# Patient Record
Sex: MALE | Race: AMERICAN INDIAN OR ALASKA NATIVE | NOT HISPANIC OR LATINO
[De-identification: names, ages, dates, MRNs, and addresses within clinical notes are randomized per-mention and may not be internally consistent; named-entity substitution may affect disease eponyms.]

---

## 2019-06-17 ENCOUNTER — APPOINTMENT (OUTPATIENT)
Dept: UROLOGY | Facility: CLINIC | Age: 49
End: 2019-06-17
Payer: COMMERCIAL

## 2019-06-17 DIAGNOSIS — Z82.49 FAMILY HISTORY OF ISCHEMIC HEART DISEASE AND OTHER DISEASES OF THE CIRCULATORY SYSTEM: ICD-10-CM

## 2019-06-17 DIAGNOSIS — I10 ESSENTIAL (PRIMARY) HYPERTENSION: ICD-10-CM

## 2019-06-17 DIAGNOSIS — Z78.9 OTHER SPECIFIED HEALTH STATUS: ICD-10-CM

## 2019-06-17 DIAGNOSIS — Z87.891 PERSONAL HISTORY OF NICOTINE DEPENDENCE: ICD-10-CM

## 2019-06-17 PROCEDURE — 99204 OFFICE O/P NEW MOD 45 MIN: CPT

## 2019-06-17 RX ORDER — AMLODIPINE BESYLATE 5 MG/1
5 TABLET ORAL
Refills: 0 | Status: ACTIVE | COMMUNITY

## 2019-06-17 NOTE — HISTORY OF PRESENT ILLNESS
[FreeTextEntry1] : This is a 49 year male who presents for evaluation of blood in the urine. no gross hematuria\par \par Medical records from Dr. Blair office reviewed:\par Cr 1.09\par April UA - 0-2 RBC\par March 2019- 20RBC\par \par April 2019\par CT scan with contrast\par IMPRESSION:  \par 1. Benign left renal cysts largest the upper pole measuring approximately 2 cm.\par 2. No renal calculi or obstructive uropathy.\par 3. Probable small gallstone.\par 4. Nonspecific 3 to 4 mm nodule left lung base pleural-based.\par  \par

## 2019-06-17 NOTE — PHYSICAL EXAM
[General Appearance - Well Developed] : well developed [General Appearance - Well Nourished] : well nourished [Normal Appearance] : normal appearance [General Appearance - In No Acute Distress] : no acute distress [Edema] : no peripheral edema [Well Groomed] : well groomed [Respiration, Rhythm And Depth] : normal respiratory rhythm and effort [Abdomen Soft] : soft [Abdomen Tenderness] : non-tender [Exaggerated Use Of Accessory Muscles For Inspiration] : no accessory muscle use [Costovertebral Angle Tenderness] : no ~M costovertebral angle tenderness [No Focal Deficits] : no focal deficits [] : no rash [Normal Station and Gait] : the gait and station were normal for the patient's age [Oriented To Time, Place, And Person] : oriented to person, place, and time [Affect] : the affect was normal [Mood] : the mood was normal [Not Anxious] : not anxious [No Palpable Adenopathy] : no palpable adenopathy

## 2019-06-17 NOTE — ASSESSMENT
[FreeTextEntry1] : This is a 49 year male who presents for evaluation of blood in the urine. no gross hematuria\par \par Medical records from Dr. Blair office reviewed:\par Cr 1.09\par April UA - 0-2 RBC\par March 2019- 20RBC\par \par April 2019\par IMPRESSION:  \par 1. Benign left renal cysts largest the upper pole measuring approximately 2 cm.\par 2. No renal calculi or obstructive uropathy.\par 3. Probable small gallstone.\par 4. Nonspecific 3 to 4 mm nodule left lung base pleural-based.\par  \par

## 2019-07-09 LAB — URINE CYTOLOGY: NORMAL

## 2019-07-26 ENCOUNTER — APPOINTMENT (OUTPATIENT)
Dept: UROLOGY | Facility: CLINIC | Age: 49
End: 2019-07-26

## 2019-09-16 ENCOUNTER — APPOINTMENT (OUTPATIENT)
Dept: UROLOGY | Facility: CLINIC | Age: 49
End: 2019-09-16
Payer: COMMERCIAL

## 2019-09-16 PROCEDURE — 52000 CYSTOURETHROSCOPY: CPT

## 2019-09-16 PROCEDURE — 99212 OFFICE O/P EST SF 10 MIN: CPT | Mod: 25

## 2019-09-17 NOTE — ASSESSMENT
[FreeTextEntry1] : This is a 49 year male who presents for evaluation of blood in the urine. no gross hematuria\par \par Medical records from Dr. Blair office reviewed:\par Cr 1.09\par April UA - 0-2 RBC\par March 2019- 20RBC\par \par April 2019\par CT scan with contrast\par IMPRESSION: \par 1. Benign left renal cysts largest the upper pole measuring approximately 2 cm.\par 2. No renal calculi or obstructive uropathy.\par 3. Probable small gallstone.\par 4. Nonspecific 3 to 4 mm nodule left lung base pleural-based.\par \par cysto today showed a small yellow lobular mass from the top of the bladder neck. no papillary

## 2019-10-15 ENCOUNTER — OUTPATIENT (OUTPATIENT)
Dept: OUTPATIENT SERVICES | Facility: HOSPITAL | Age: 49
LOS: 1 days | Discharge: HOME | End: 2019-10-15
Payer: COMMERCIAL

## 2019-10-15 VITALS
OXYGEN SATURATION: 95 % | RESPIRATION RATE: 16 BRPM | HEIGHT: 67 IN | SYSTOLIC BLOOD PRESSURE: 143 MMHG | WEIGHT: 172.18 LBS | DIASTOLIC BLOOD PRESSURE: 95 MMHG | TEMPERATURE: 98 F | HEART RATE: 92 BPM

## 2019-10-15 DIAGNOSIS — R31.9 HEMATURIA, UNSPECIFIED: ICD-10-CM

## 2019-10-15 DIAGNOSIS — Z01.818 ENCOUNTER FOR OTHER PREPROCEDURAL EXAMINATION: ICD-10-CM

## 2019-10-15 LAB
ALBUMIN SERPL ELPH-MCNC: 4.8 G/DL — SIGNIFICANT CHANGE UP (ref 3.5–5.2)
ALP SERPL-CCNC: 52 U/L — SIGNIFICANT CHANGE UP (ref 30–115)
ALT FLD-CCNC: 23 U/L — SIGNIFICANT CHANGE UP (ref 0–41)
ANION GAP SERPL CALC-SCNC: 14 MMOL/L — SIGNIFICANT CHANGE UP (ref 7–14)
APPEARANCE UR: CLEAR — SIGNIFICANT CHANGE UP
APTT BLD: 33.8 SEC — SIGNIFICANT CHANGE UP (ref 27–39.2)
AST SERPL-CCNC: 22 U/L — SIGNIFICANT CHANGE UP (ref 0–41)
BACTERIA # UR AUTO: NEGATIVE — SIGNIFICANT CHANGE UP
BILIRUB SERPL-MCNC: 0.2 MG/DL — SIGNIFICANT CHANGE UP (ref 0.2–1.2)
BILIRUB UR-MCNC: NEGATIVE — SIGNIFICANT CHANGE UP
BUN SERPL-MCNC: 20 MG/DL — SIGNIFICANT CHANGE UP (ref 10–20)
CALCIUM SERPL-MCNC: 9.4 MG/DL — SIGNIFICANT CHANGE UP (ref 8.5–10.1)
CHLORIDE SERPL-SCNC: 99 MMOL/L — SIGNIFICANT CHANGE UP (ref 98–110)
CO2 SERPL-SCNC: 27 MMOL/L — SIGNIFICANT CHANGE UP (ref 17–32)
COLOR SPEC: SIGNIFICANT CHANGE UP
CREAT SERPL-MCNC: 1.5 MG/DL — SIGNIFICANT CHANGE UP (ref 0.7–1.5)
DIFF PNL FLD: ABNORMAL
EPI CELLS # UR: 0 /HPF — SIGNIFICANT CHANGE UP (ref 0–5)
GLUCOSE SERPL-MCNC: 88 MG/DL — SIGNIFICANT CHANGE UP (ref 70–99)
GLUCOSE UR QL: NEGATIVE — SIGNIFICANT CHANGE UP
HCT VFR BLD CALC: 45.9 % — SIGNIFICANT CHANGE UP (ref 42–52)
HGB BLD-MCNC: 14.9 G/DL — SIGNIFICANT CHANGE UP (ref 14–18)
HYALINE CASTS # UR AUTO: 1 /LPF — SIGNIFICANT CHANGE UP (ref 0–7)
INR BLD: 0.89 RATIO — SIGNIFICANT CHANGE UP (ref 0.65–1.3)
KETONES UR-MCNC: NEGATIVE — SIGNIFICANT CHANGE UP
LEUKOCYTE ESTERASE UR-ACNC: NEGATIVE — SIGNIFICANT CHANGE UP
MCHC RBC-ENTMCNC: 27.9 PG — SIGNIFICANT CHANGE UP (ref 27–31)
MCHC RBC-ENTMCNC: 32.5 G/DL — SIGNIFICANT CHANGE UP (ref 32–37)
MCV RBC AUTO: 86 FL — SIGNIFICANT CHANGE UP (ref 80–94)
NITRITE UR-MCNC: NEGATIVE — SIGNIFICANT CHANGE UP
NRBC # BLD: 0 /100 WBCS — SIGNIFICANT CHANGE UP (ref 0–0)
PH UR: 6 — SIGNIFICANT CHANGE UP (ref 5–8)
PLATELET # BLD AUTO: 308 K/UL — SIGNIFICANT CHANGE UP (ref 130–400)
POTASSIUM SERPL-MCNC: 3.6 MMOL/L — SIGNIFICANT CHANGE UP (ref 3.5–5)
POTASSIUM SERPL-SCNC: 3.6 MMOL/L — SIGNIFICANT CHANGE UP (ref 3.5–5)
PROT SERPL-MCNC: 8.1 G/DL — HIGH (ref 6–8)
PROT UR-MCNC: ABNORMAL
PROTHROM AB SERPL-ACNC: 10.2 SEC — SIGNIFICANT CHANGE UP (ref 9.95–12.87)
RBC # BLD: 5.34 M/UL — SIGNIFICANT CHANGE UP (ref 4.7–6.1)
RBC # FLD: 12.3 % — SIGNIFICANT CHANGE UP (ref 11.5–14.5)
RBC CASTS # UR COMP ASSIST: 23 /HPF — HIGH (ref 0–4)
SODIUM SERPL-SCNC: 140 MMOL/L — SIGNIFICANT CHANGE UP (ref 135–146)
SP GR SPEC: 1.02 — SIGNIFICANT CHANGE UP (ref 1.01–1.02)
UROBILINOGEN FLD QL: SIGNIFICANT CHANGE UP
WBC # BLD: 10.02 K/UL — SIGNIFICANT CHANGE UP (ref 4.8–10.8)
WBC # FLD AUTO: 10.02 K/UL — SIGNIFICANT CHANGE UP (ref 4.8–10.8)
WBC UR QL: 2 /HPF — SIGNIFICANT CHANGE UP (ref 0–5)

## 2019-10-15 PROCEDURE — 93010 ELECTROCARDIOGRAM REPORT: CPT

## 2019-10-15 NOTE — H&P PST ADULT - HISTORY OF PRESENT ILLNESS
49 year old male here had red blood cells in urine here for bladder bx  fos=4  denies chest pain sob palp  denies recent uri or uti

## 2019-10-15 NOTE — H&P PST ADULT - NSANTHOSAYNRD_GEN_A_CORE
denies/No. ANICETO screening performed.  STOP BANG Legend: 0-2 = LOW Risk; 3-4 = INTERMEDIATE Risk; 5-8 = HIGH Risk

## 2019-10-17 LAB
CULTURE RESULTS: SIGNIFICANT CHANGE UP
SPECIMEN SOURCE: SIGNIFICANT CHANGE UP

## 2019-10-29 ENCOUNTER — RESULT REVIEW (OUTPATIENT)
Age: 49
End: 2019-10-29

## 2019-10-29 ENCOUNTER — OUTPATIENT (OUTPATIENT)
Dept: OUTPATIENT SERVICES | Facility: HOSPITAL | Age: 49
LOS: 1 days | Discharge: HOME | End: 2019-10-29
Payer: COMMERCIAL

## 2019-10-29 ENCOUNTER — APPOINTMENT (OUTPATIENT)
Dept: UROLOGY | Facility: HOSPITAL | Age: 49
End: 2019-10-29
Payer: COMMERCIAL

## 2019-10-29 VITALS
TEMPERATURE: 99 F | WEIGHT: 169.98 LBS | HEART RATE: 88 BPM | OXYGEN SATURATION: 99 % | HEIGHT: 67 IN | SYSTOLIC BLOOD PRESSURE: 150 MMHG | RESPIRATION RATE: 18 BRPM | DIASTOLIC BLOOD PRESSURE: 74 MMHG

## 2019-10-29 VITALS — SYSTOLIC BLOOD PRESSURE: 124 MMHG | HEART RATE: 85 BPM | DIASTOLIC BLOOD PRESSURE: 73 MMHG | RESPIRATION RATE: 17 BRPM

## 2019-10-29 PROBLEM — I10 ESSENTIAL (PRIMARY) HYPERTENSION: Chronic | Status: ACTIVE | Noted: 2019-10-15

## 2019-10-29 PROCEDURE — 88305 TISSUE EXAM BY PATHOLOGIST: CPT | Mod: 26

## 2019-10-29 PROCEDURE — 88112 CYTOPATH CELL ENHANCE TECH: CPT | Mod: 26

## 2019-10-29 PROCEDURE — 52204 CYSTOSCOPY W/BIOPSY(S): CPT

## 2019-10-29 RX ORDER — OXYCODONE AND ACETAMINOPHEN 5; 325 MG/1; MG/1
1 TABLET ORAL EVERY 4 HOURS
Refills: 0 | Status: DISCONTINUED | OUTPATIENT
Start: 2019-10-29 | End: 2019-10-29

## 2019-10-29 RX ORDER — SODIUM CHLORIDE 9 MG/ML
1000 INJECTION, SOLUTION INTRAVENOUS
Refills: 0 | Status: DISCONTINUED | OUTPATIENT
Start: 2019-10-29 | End: 2019-11-17

## 2019-10-29 RX ORDER — ONDANSETRON 8 MG/1
4 TABLET, FILM COATED ORAL ONCE
Refills: 0 | Status: DISCONTINUED | OUTPATIENT
Start: 2019-10-29 | End: 2019-11-17

## 2019-10-29 RX ORDER — PHENAZOPYRIDINE HCL 100 MG
1 TABLET ORAL
Qty: 4 | Refills: 0
Start: 2019-10-29 | End: 2019-10-30

## 2019-10-29 RX ORDER — HYDROMORPHONE HYDROCHLORIDE 2 MG/ML
0.5 INJECTION INTRAMUSCULAR; INTRAVENOUS; SUBCUTANEOUS
Refills: 0 | Status: DISCONTINUED | OUTPATIENT
Start: 2019-10-29 | End: 2019-10-29

## 2019-10-29 RX ORDER — ACETAMINOPHEN 500 MG
650 TABLET ORAL ONCE
Refills: 0 | Status: DISCONTINUED | OUTPATIENT
Start: 2019-10-29 | End: 2019-11-17

## 2019-10-29 RX ADMIN — SODIUM CHLORIDE 100 MILLILITER(S): 9 INJECTION, SOLUTION INTRAVENOUS at 08:45

## 2019-10-29 NOTE — ASU DISCHARGE PLAN (ADULT/PEDIATRIC) - CARE PROVIDER_API CALL
Miguel Saravia)  [facility]  [address] [place] [address], [address]  [place] [address]  Phone: [phone]  Fax: [phone]  Follow Up Time:

## 2019-10-30 LAB
NON-GYNECOLOGICAL CYTOLOGY STUDY: SIGNIFICANT CHANGE UP
SURGICAL PATHOLOGY STUDY: SIGNIFICANT CHANGE UP

## 2019-11-04 ENCOUNTER — APPOINTMENT (OUTPATIENT)
Dept: UROLOGY | Facility: CLINIC | Age: 49
End: 2019-11-04
Payer: COMMERCIAL

## 2019-11-04 DIAGNOSIS — I10 ESSENTIAL (PRIMARY) HYPERTENSION: ICD-10-CM

## 2019-11-04 DIAGNOSIS — Z71.2 PERSON CONSULTING FOR EXPLANATION OF EXAMINATION OR TEST FINDINGS: ICD-10-CM

## 2019-11-04 DIAGNOSIS — N32.89 OTHER SPECIFIED DISORDERS OF BLADDER: ICD-10-CM

## 2019-11-04 DIAGNOSIS — R31.9 HEMATURIA, UNSPECIFIED: ICD-10-CM

## 2019-11-04 DIAGNOSIS — N28.1 CYST OF KIDNEY, ACQUIRED: ICD-10-CM

## 2019-11-04 PROCEDURE — 99214 OFFICE O/P EST MOD 30 MIN: CPT

## 2019-11-04 NOTE — HISTORY OF PRESENT ILLNESS
[FreeTextEntry1] : This is a 49 year male who presents for evaluation of blood in the urine. no gross hematuria\par \par Medical records from Dr. Blair office reviewed:\par Cr 1.09\par April UA - 0-2 RBC\par March 2019- 20RBC\par \par April 2019\par CT scan with contrast\par IMPRESSION: \par 1. Benign left renal cysts largest the upper pole measuring approximately 2 cm.\par 2. No renal calculi or obstructive uropathy.\par 3. Probable small gallstone.\par 4. Nonspecific 3 to 4 mm nodule left lung base pleural-based.\par \par cysto showed a small yellow lobular mass from the top of the bladder neck. no papillary. \par this was biopsied and showed no malignancy\par \par will order renal sonogram for renal cyst evaluation at the next visit

## 2020-11-04 ENCOUNTER — APPOINTMENT (OUTPATIENT)
Dept: UROLOGY | Facility: CLINIC | Age: 50
End: 2020-11-04

## 2020-12-23 ENCOUNTER — EMERGENCY (EMERGENCY)
Facility: HOSPITAL | Age: 50
LOS: 0 days | Discharge: HOME | End: 2020-12-23
Attending: EMERGENCY MEDICINE | Admitting: EMERGENCY MEDICINE
Payer: COMMERCIAL

## 2020-12-23 ENCOUNTER — TRANSCRIPTION ENCOUNTER (OUTPATIENT)
Age: 50
End: 2020-12-23

## 2020-12-23 VITALS — HEART RATE: 87 BPM | DIASTOLIC BLOOD PRESSURE: 81 MMHG | SYSTOLIC BLOOD PRESSURE: 126 MMHG

## 2020-12-23 VITALS — WEIGHT: 145.95 LBS | HEIGHT: 67 IN

## 2020-12-23 DIAGNOSIS — R10.9 UNSPECIFIED ABDOMINAL PAIN: ICD-10-CM

## 2020-12-23 DIAGNOSIS — K57.92 DIVERTICULITIS OF INTESTINE, PART UNSPECIFIED, WITHOUT PERFORATION OR ABSCESS WITHOUT BLEEDING: ICD-10-CM

## 2020-12-23 DIAGNOSIS — K63.89 OTHER SPECIFIED DISEASES OF INTESTINE: ICD-10-CM

## 2020-12-23 LAB
ALBUMIN SERPL ELPH-MCNC: 4.2 G/DL — SIGNIFICANT CHANGE UP (ref 3.5–5.2)
ALP SERPL-CCNC: 72 U/L — SIGNIFICANT CHANGE UP (ref 30–115)
ALT FLD-CCNC: 43 U/L — HIGH (ref 0–41)
ANION GAP SERPL CALC-SCNC: 11 MMOL/L — SIGNIFICANT CHANGE UP (ref 7–14)
APPEARANCE UR: CLEAR — SIGNIFICANT CHANGE UP
AST SERPL-CCNC: 47 U/L — HIGH (ref 0–41)
BASOPHILS # BLD AUTO: 0.06 K/UL — SIGNIFICANT CHANGE UP (ref 0–0.2)
BASOPHILS NFR BLD AUTO: 0.4 % — SIGNIFICANT CHANGE UP (ref 0–1)
BILIRUB DIRECT SERPL-MCNC: <0.2 MG/DL — SIGNIFICANT CHANGE UP (ref 0–0.2)
BILIRUB INDIRECT FLD-MCNC: >0.1 MG/DL — LOW (ref 0.2–1.2)
BILIRUB SERPL-MCNC: 0.3 MG/DL — SIGNIFICANT CHANGE UP (ref 0.2–1.2)
BILIRUB UR-MCNC: NEGATIVE — SIGNIFICANT CHANGE UP
BUN SERPL-MCNC: 20 MG/DL — SIGNIFICANT CHANGE UP (ref 10–20)
CALCIUM SERPL-MCNC: 9.6 MG/DL — SIGNIFICANT CHANGE UP (ref 8.5–10.1)
CHLORIDE SERPL-SCNC: 99 MMOL/L — SIGNIFICANT CHANGE UP (ref 98–110)
CO2 SERPL-SCNC: 30 MMOL/L — SIGNIFICANT CHANGE UP (ref 17–32)
COLOR SPEC: YELLOW — SIGNIFICANT CHANGE UP
CREAT SERPL-MCNC: 1.3 MG/DL — SIGNIFICANT CHANGE UP (ref 0.7–1.5)
DIFF PNL FLD: ABNORMAL
EOSINOPHIL # BLD AUTO: 0.24 K/UL — SIGNIFICANT CHANGE UP (ref 0–0.7)
EOSINOPHIL NFR BLD AUTO: 1.7 % — SIGNIFICANT CHANGE UP (ref 0–8)
GLUCOSE SERPL-MCNC: 146 MG/DL — HIGH (ref 70–99)
GLUCOSE UR QL: NEGATIVE MG/DL — SIGNIFICANT CHANGE UP
HCT VFR BLD CALC: 45.3 % — SIGNIFICANT CHANGE UP (ref 42–52)
HGB BLD-MCNC: 14.7 G/DL — SIGNIFICANT CHANGE UP (ref 14–18)
IMM GRANULOCYTES NFR BLD AUTO: 0.3 % — SIGNIFICANT CHANGE UP (ref 0.1–0.3)
KETONES UR-MCNC: NEGATIVE — SIGNIFICANT CHANGE UP
LACTATE SERPL-SCNC: 1.3 MMOL/L — SIGNIFICANT CHANGE UP (ref 0.7–2)
LEUKOCYTE ESTERASE UR-ACNC: NEGATIVE — SIGNIFICANT CHANGE UP
LIDOCAIN IGE QN: 49 U/L — SIGNIFICANT CHANGE UP (ref 7–60)
LYMPHOCYTES # BLD AUTO: 1.73 K/UL — SIGNIFICANT CHANGE UP (ref 1.2–3.4)
LYMPHOCYTES # BLD AUTO: 12.2 % — LOW (ref 20.5–51.1)
MCHC RBC-ENTMCNC: 27.9 PG — SIGNIFICANT CHANGE UP (ref 27–31)
MCHC RBC-ENTMCNC: 32.5 G/DL — SIGNIFICANT CHANGE UP (ref 32–37)
MCV RBC AUTO: 86 FL — SIGNIFICANT CHANGE UP (ref 80–94)
MONOCYTES # BLD AUTO: 0.82 K/UL — HIGH (ref 0.1–0.6)
MONOCYTES NFR BLD AUTO: 5.8 % — SIGNIFICANT CHANGE UP (ref 1.7–9.3)
NEUTROPHILS # BLD AUTO: 11.33 K/UL — HIGH (ref 1.4–6.5)
NEUTROPHILS NFR BLD AUTO: 79.6 % — HIGH (ref 42.2–75.2)
NITRITE UR-MCNC: NEGATIVE — SIGNIFICANT CHANGE UP
NRBC # BLD: 0 /100 WBCS — SIGNIFICANT CHANGE UP (ref 0–0)
PH UR: 6 — SIGNIFICANT CHANGE UP (ref 5–8)
PLATELET # BLD AUTO: 279 K/UL — SIGNIFICANT CHANGE UP (ref 130–400)
POTASSIUM SERPL-MCNC: 4.1 MMOL/L — SIGNIFICANT CHANGE UP (ref 3.5–5)
POTASSIUM SERPL-SCNC: 4.1 MMOL/L — SIGNIFICANT CHANGE UP (ref 3.5–5)
PROT SERPL-MCNC: 7.7 G/DL — SIGNIFICANT CHANGE UP (ref 6–8)
PROT UR-MCNC: 30 MG/DL
RBC # BLD: 5.27 M/UL — SIGNIFICANT CHANGE UP (ref 4.7–6.1)
RBC # FLD: 12.6 % — SIGNIFICANT CHANGE UP (ref 11.5–14.5)
RBC CASTS # UR COMP ASSIST: >50 /HPF
SODIUM SERPL-SCNC: 140 MMOL/L — SIGNIFICANT CHANGE UP (ref 135–146)
SP GR SPEC: 1.01 — SIGNIFICANT CHANGE UP (ref 1.01–1.03)
UROBILINOGEN FLD QL: 0.2 MG/DL — SIGNIFICANT CHANGE UP (ref 0.2–0.2)
WBC # BLD: 14.22 K/UL — HIGH (ref 4.8–10.8)
WBC # FLD AUTO: 14.22 K/UL — HIGH (ref 4.8–10.8)
WBC UR QL: ABNORMAL /HPF

## 2020-12-23 PROCEDURE — 99284 EMERGENCY DEPT VISIT MOD MDM: CPT

## 2020-12-23 PROCEDURE — 74177 CT ABD & PELVIS W/CONTRAST: CPT | Mod: 26

## 2020-12-23 RX ORDER — MOXIFLOXACIN HYDROCHLORIDE TABLETS, 400 MG 400 MG/1
1 TABLET, FILM COATED ORAL
Qty: 14 | Refills: 0
Start: 2020-12-23 | End: 2020-12-29

## 2020-12-23 RX ORDER — METRONIDAZOLE 500 MG
1 TABLET ORAL
Qty: 21 | Refills: 0
Start: 2020-12-23 | End: 2020-12-29

## 2020-12-23 RX ORDER — AMLODIPINE BESYLATE 2.5 MG/1
1 TABLET ORAL
Qty: 0 | Refills: 0 | DISCHARGE

## 2020-12-23 RX ORDER — SODIUM CHLORIDE 9 MG/ML
1000 INJECTION, SOLUTION INTRAVENOUS ONCE
Refills: 0 | Status: COMPLETED | OUTPATIENT
Start: 2020-12-23 | End: 2020-12-23

## 2020-12-23 RX ORDER — MORPHINE SULFATE 50 MG/1
4 CAPSULE, EXTENDED RELEASE ORAL ONCE
Refills: 0 | Status: DISCONTINUED | OUTPATIENT
Start: 2020-12-23 | End: 2020-12-23

## 2020-12-23 RX ADMIN — SODIUM CHLORIDE 1000 MILLILITER(S): 9 INJECTION, SOLUTION INTRAVENOUS at 16:57

## 2020-12-23 RX ADMIN — MORPHINE SULFATE 4 MILLIGRAM(S): 50 CAPSULE, EXTENDED RELEASE ORAL at 16:57

## 2020-12-23 RX ADMIN — MORPHINE SULFATE 4 MILLIGRAM(S): 50 CAPSULE, EXTENDED RELEASE ORAL at 17:12

## 2020-12-23 NOTE — ED ADULT TRIAGE NOTE - NS ED TRIAGE AVPU SCALE
comfortable appearance/cooperative
Alert-The patient is alert, awake and responds to voice. The patient is oriented to time, place, and person. The triage nurse is able to obtain subjective information.

## 2020-12-23 NOTE — ED PROVIDER NOTE - PHYSICAL EXAMINATION
Physical Exam    Vital Signs: I have reviewed the initial vital signs.  Constitutional: well-nourished, appears stated age, no acute distress  Eyes: Conjunctiva pink, Sclera clear.  Cardiovascular: S1 and S2, regular rate, regular rhythm, well-perfused extremities, radial pulses equal and 2+  Respiratory: unlabored respiratory effort, clear to auscultation bilaterally no wheezing, rales and rhonchi  Gastrointestinal: soft, ttp rlq, no pulsatile mass, normal bowl sounds  Musculoskeletal: supple neck, no lower extremity edema, no midline tenderness  Integumentary: warm, dry, no rash  Neurologic: awake, alert, nvi

## 2020-12-23 NOTE — ED PROVIDER NOTE - NSFOLLOWUPINSTRUCTIONS_ED_ALL_ED_FT
Diverticulitis    Diverticulitis is inflammation or infection of small pouches in your colon that form when you HAVE a condition called diverticulosis. This condition can range from mild to severe potentially leading to perforation or obstructions of your colon. Symptoms include abdominal pain, fever/chills, nausea, vomiting, diarrhea, constipation, or blood in your stool. If you were prescribed an antibiotic medicine, take it as told by your health care provider. Do not stop taking the antibiotic even if you start to feel better.    SEEK IMMEDIATE MEDICAL CARE IF YOU HAVE ANY OF THE FOLLOWING SYMPTOMS: worsening abdominal pain, high fever, inability to hold down liquids or medication, black or bloody stools, inability to pass gas, lightheadedness/dizziness, or a change in mental status.    Epiploic Appendagitis    What is Epiploic appendagitis?    Epiploic appendagitis can be defined as the inflammatory or ischemic process that occurs at the level of the epiploic appendices. There are certain characteristics that can be used in order to describe this inflammatory process – first of all, the condition is benign and self-limiting. It is non-surgical, which means that it cannot be solved through a surgical intervention. It is also an uncommon condition, which means that there are not many people diagnosed with it (rare cause of acute abdomen)    Causes    In the majority of the cases, the inflammatory process that occurs at the level of the epiploic appendices is due to a torsion of an appendage that is both large and pedunculated. In other patients, the venous thrombosis is responsible for the acute inflammation of the epiploic appendices (the thrombosis occurs at the level of the epiploic appendage central draining vein). When the spontaneous thrombosis of the venous overflow occurs, the patient will suffer from both ischemia and necrosis.    Symptoms    These are the most common symptoms caused by epiploic appendagitis:   Pain Caused by the inflammatory process  Characterized as sharp or stabbing  Can be present in different regions of the abdomen (left, right, central), depending on the location of inflammation at the level of the colon/rectum  Pain becomes aggravated during or after defecation or urination (the straining or emptying of the bowels or the bladder create a traction effect on the inflamed epiploic appendices, hence the aggravation of the pain)    Nausea and vomiting  Guarding can also be present as a symptom  Focal peritoneal irritation    It is important to understand that the symptoms of epiploic appendagitis can very well resemble the ones of acute appendicitis, not to mention diverticulitis or cholecystitis. In general, if the epiploic appendagitis occurs on the right-side of the abdomen, it can mimic the symptoms of appendicitis and right-sided diverticulitis. However, if the condition occurs on the left-side of the abdomen, it will mimic the symptoms of sigmoid diverticulitis.      Treatment    In general, this is a self-limiting disease and the treatment is often concentrated on the management of the symptoms. For the majority of the cases, the condition resolves itself on 5-7 days, without any surgical intervention being necessary. These are the recommended methods of treatment for epiploic appendagitis:  Anti-inflammatory (NSAIDs) or pain medication Recommended as symptomatic treatment (pain, discomfort)    Laparoscopic surgery Only recommended in patients who suffer from constant recurrences  The inflamed appendage is excised through the laparoscopic surgical intervention, thus eliminating the risk of this condition recurring.

## 2020-12-23 NOTE — ED PROVIDER NOTE - OBJECTIVE STATEMENT
51 yo male, pmh of htn, presents to ed for abd pain. started last night, rlq, mild, aching, no radiation, [place] [facility] [place]. Denies fever, chills, testicular pain, dysuria, hematuria, back pain, neck pain, nvd.

## 2020-12-23 NOTE — ED PROVIDER NOTE - PATIENT PORTAL LINK FT
You can access the FollowMyHealth Patient Portal offered by [facility] by registering at the following website: [email or web address]. By joining [employer]’s FollowMyHealth portal, you will also be able to view your health information using other applications (apps) compatible with our system.

## 2020-12-23 NOTE — ED PROVIDER NOTE - CLINICAL SUMMARY MEDICAL DECISION MAKING FREE TEXT BOX
49 yo M, hx of well controlled HTN, here for assessment of RLQ abdominal pain -- pain began last night, aching worse with movement, no nausea, vomiting, fever. Sent from [facility] for r/o appendicitis.    VS normal, Patient has RLQ ttp, no rebound or guarding, normal testicular exam without swelling or ttp, no rash, no LE edema.    Labs unremarkable, UA is positive for blood and WBC, unclear why as he has no kidney stone on CT, could be hemorrhagic cystitis, culture pending. CT scan demonstrates normal appendix but also diverticulitis vs epiploic appendagitis    Patient started on cipro/flagyl -- will cover both diverticulitis and possible UTI. Advised patient on need for GI follow up as his sx are R sided, he is young and could potentially represent underlying mass.

## 2020-12-23 NOTE — ED PROVIDER NOTE - CARE PROVIDER_API CALL
Bertha Carreon (MD)  Gastroenterology  [address] [place] [address]  Phone: [phone]  Fax: [phone]  Follow Up Time:

## 2021-01-19 ENCOUNTER — APPOINTMENT (OUTPATIENT)
Dept: GASTROENTEROLOGY | Facility: CLINIC | Age: 51
End: 2021-01-19

## 2021-02-12 ENCOUNTER — OUTPATIENT (OUTPATIENT)
Dept: OUTPATIENT SERVICES | Facility: HOSPITAL | Age: 51
LOS: 1 days | Discharge: HOME | End: 2021-02-12

## 2021-02-12 ENCOUNTER — APPOINTMENT (OUTPATIENT)
Dept: GASTROENTEROLOGY | Facility: CLINIC | Age: 51
End: 2021-02-12
Payer: COMMERCIAL

## 2021-02-12 DIAGNOSIS — Z00.00 ENCOUNTER FOR GENERAL ADULT MEDICAL EXAMINATION WITHOUT ABNORMAL FINDINGS: ICD-10-CM

## 2021-02-12 DIAGNOSIS — K57.32 DIVERTICULITIS OF LARGE INTESTINE W/OUT PERFORATION OR ABSCESS W/OUT BLEEDING: ICD-10-CM

## 2021-02-12 DIAGNOSIS — Z00.00 ENCOUNTER FOR GENERAL ADULT MEDICAL EXAMINATION W/OUT ABNORMAL FINDINGS: ICD-10-CM

## 2021-02-12 PROCEDURE — 99213 OFFICE O/P EST LOW 20 MIN: CPT | Mod: 95,GE

## 2021-02-12 RX ORDER — POLYETHYLENE GLYCOL 3350 17 G/17G
17 POWDER, FOR SOLUTION ORAL
Qty: 1 | Refills: 0 | Status: ACTIVE | COMMUNITY
Start: 2021-02-12 | End: 1900-01-01

## 2021-02-12 NOTE — REASON FOR VISIT
[Home] : at home, [unfilled] , at the time of the visit. [Medical Office: ([facility])___] : at the medical office located in  [Verbal consent obtained from patient] : the patient, [unfilled] [Spouse] : spouse [Initial Evaluation] : an initial evaluation

## 2021-02-12 NOTE — REVIEW OF SYSTEMS
[As Noted in HPI] : as noted in HPI [Joint Pain] : joint pain [Negative] : Genitourinary [FreeTextEntry9] : since starting at [employer]

## 2021-02-12 NOTE — HISTORY OF PRESENT ILLNESS
[de-identified] : 50 year old male PMH of HTN with presents telephonically after an ER visit for evaluation of his possible. In December 2020 you had 2 days of fever and abdominal pain, so he went to urgent care. The patient had right sided abdominal pain at the level of the umbilicus, 9/10 in intensity, non radiating, nothing seemed to make it better or worse. Prior to the event patient was constipated for three days. He went to urgent care where he was sent to the ED to r/o appendicitis. CT scan showed mesenteric fat stranding the right lower quadrant. There are multiple colonic diverticula, without definitive evidence of perforation or drainable fluid collection. The appendix measures 6 mm in diameter, which is top normal. The appendix wall is not thickened. Findings are consistent with cecal diverticulitis, or possibly infarcted epiploic appendage. The patient was prescribed cipro and flagyl for 1 week and the pain resolved. The patient denies any pain currently, denies blood BMs, no more fevers. Never had a colonoscopy or endoscopy in the past. \par \par Patient is a former smoker 23 years, quit >10 years ago, patient drinks occasionally, denies drug use. Denies FHx of cancer.

## 2021-02-12 NOTE — ASSESSMENT
[FreeTextEntry1] : 50 year old male PMH of HTN with presents telephonically after an ER visit for evaluation of his possible.\par \par # Abdominal pain\par - CT abdomen on 12/23/2020 showed mesenteric fat stranding the right lower quadrant. There are multiple colonic diverticula, without definitive evidence of perforation or drainable fluid collection. The appendix measures 6 mm in diameter, which is top normal. The appendix wall is not thickened. Findings are consistent with cecal diverticulitis, or possibly infarcted epiploic appendage\par - Patient needs colonoscopy as diverticulitis was in the DDX. Patient is agreeable to colonscopy. \par - RTC after colonoscopy.
